# Patient Record
Sex: MALE | Race: BLACK OR AFRICAN AMERICAN | NOT HISPANIC OR LATINO
[De-identification: names, ages, dates, MRNs, and addresses within clinical notes are randomized per-mention and may not be internally consistent; named-entity substitution may affect disease eponyms.]

---

## 2020-02-28 PROBLEM — Z00.00 ENCOUNTER FOR PREVENTIVE HEALTH EXAMINATION: Status: ACTIVE | Noted: 2020-02-28

## 2020-03-04 ENCOUNTER — APPOINTMENT (OUTPATIENT)
Dept: GASTROENTEROLOGY | Facility: CLINIC | Age: 55
End: 2020-03-04

## 2024-07-14 PROBLEM — Z78.9 NON-SMOKER: Status: ACTIVE | Noted: 2024-07-14

## 2024-08-20 ENCOUNTER — NON-APPOINTMENT (OUTPATIENT)
Age: 59
End: 2024-08-20

## 2024-08-20 ENCOUNTER — APPOINTMENT (OUTPATIENT)
Dept: GASTROENTEROLOGY | Facility: CLINIC | Age: 59
End: 2024-08-20
Payer: COMMERCIAL

## 2024-08-20 DIAGNOSIS — Z80.0 FAMILY HISTORY OF MALIGNANT NEOPLASM OF DIGESTIVE ORGANS: ICD-10-CM

## 2024-08-20 DIAGNOSIS — Z86.19 PERSONAL HISTORY OF OTHER INFECTIOUS AND PARASITIC DISEASES: ICD-10-CM

## 2024-08-20 DIAGNOSIS — R10.13 EPIGASTRIC PAIN: ICD-10-CM

## 2024-08-20 PROCEDURE — 99204 OFFICE O/P NEW MOD 45 MIN: CPT

## 2024-08-24 ENCOUNTER — LABORATORY RESULT (OUTPATIENT)
Age: 59
End: 2024-08-24

## 2024-08-25 NOTE — HISTORY OF PRESENT ILLNESS
[FreeTextEntry1] :     This HPI reflects a summary and review of records : including previous and most recent  Labs, body imaging, consults and progress notes, operative and pathology reports, EKG reports, ED records, found in [facility],  [facility] and any additional records brought in by  the patient at the time of the visit.   PCP:  [facility]   59 yo M w h/o H. Pylori many yrs ago, was treated partially--had a rash to amox 3/4 of the treatment    Most recent labs reviewed : 1/10/20 H.Pylori Stool Ag--> neg  8/20/24    Today: no c/o , CP, SOB/ JONES, Cough, Wheeze, Palpitations, edema     8/20/24   Today:  recently had epigastric pain, crampy sht lived w some nausea                             Had a metallic taste in mouth, which is what he had w H.Pylori                             Wants to get retested for H.Pylori                             No NSAIDS, melena, anorexia, wt loss, early satiety                            * Abd pain-->resolved  * Nausea--> resolved  * Vomit--> no * Early satiety--> no * Belching--> no * Hiccups--> no * Regurgitation--> no * Acid Taste / Water Brash--> no * Ht burn--> no * Dysphagia--> no * Throat Clearing--> no * Hoarseness--> no * Post-Nasal Drip--> no * Congestion--> no * Globus--> no * Cough--> no * Wheeze / PC-> -no * BMs: # 4 qd * Constipation--> no * Diarrhea--> no * Bloating--> no * Strain on Defecation--> no * Incompl Evac--> no * Flatulence--> no * Gurgling--> no * Melena--> no * BPBPR-> -no * Anorexia--> no * Wt. Loss--> no

## 2024-08-25 NOTE — REVIEW OF SYSTEMS
[Scleral Icterus (Yellow Eyes)] : no scleral icterus [Skin Lesions] : no skin lesions [Proptosis] : no proptosis [Confused] : no confusion [Easy Bruising] : no tendency for easy bruising

## 2024-08-25 NOTE — ASSESSMENT
[FreeTextEntry1] :  1. Epigastric pain--> sht lived     assoc w mild nausea, metallic taste      R/O H.Pylori Gastritis            GERD            Cholelithiasis            Functional  P: No NSAIDs/ ETOH  Recommend:  * Anti-reflux diet & life-style changes reviewed & re-emphasized.   * HOB elevation /  Bedge use emphasized * Weight reduction & regular exercise emphasized  check H. Pylori UBT Abd sono ? EGD    Informed Consent: * The risks & Benefits of EGD  were discussed w patient. * This included but was not limited to perforation, bleeding, sedation /med rxns, missed lesions possibly requiring surgery, blood transfusions, antibiotics & CPR/Intubation. * Pt. understands & agrees to the procedures. The following instructions in regards to the prep and medically essential ( cardiac, pulmonary, sz, psych, endocrine)  pre-op medication administration was reviewed and emphasized with the patient .  * Pt. advised to D/C  ASA/NSAIDs  7  Days  PTP. * [ +++ ]  Dulcolax / Miralax / Mag. Citrate ,  [     ] Prepopik/ Clenpiq ,  [     ] Osmo Prep,  [    ] GoLytely,  prep. reviewed w Pt. * Hold  [           ] AM of procedure. * Hold  [           ] PM  before procedure. * Take  [           ] PM  before procedure. * Take  [           ] AM of procedure.

## 2024-08-25 NOTE — HISTORY OF PRESENT ILLNESS
[FreeTextEntry1] :     This HPI reflects a summary and review of records : including previous and most recent  Labs, body imaging, consults and progress notes, operative and pathology reports, EKG reports, ED records, found in [facility],  [facility] and any additional records brought in by  the patient at the time of the visit.   PCP:  [facility]   57 yo M w h/o H. Pylori many yrs ago, was treated partially--had a rash to amox 3/4 of the treatment    Most recent labs reviewed : 1/10/20 H.Pylori Stool Ag--> neg  8/20/24    Today: no c/o , CP, SOB/ JONES, Cough, Wheeze, Palpitations, edema     8/20/24   Today:  recently had epigastric pain, crampy sht lived w some nausea                             Had a metallic taste in mouth, which is what he had w H.Pylori                             Wants to get retested for H.Pylori                             No NSAIDS, melena, anorexia, wt loss, early satiety                            * Abd pain-->resolved  * Nausea--> resolved  * Vomit--> no * Early satiety--> no * Belching--> no * Hiccups--> no * Regurgitation--> no * Acid Taste / Water Brash--> no * Ht burn--> no * Dysphagia--> no * Throat Clearing--> no * Hoarseness--> no * Post-Nasal Drip--> no * Congestion--> no * Globus--> no * Cough--> no * Wheeze / PC-> -no * BMs: # 4 qd * Constipation--> no * Diarrhea--> no * Bloating--> no * Strain on Defecation--> no * Incompl Evac--> no * Flatulence--> no * Gurgling--> no * Melena--> no * BPBPR-> -no * Anorexia--> no * Wt. Loss--> no

## 2024-08-30 ENCOUNTER — RESULT REVIEW (OUTPATIENT)
Age: 59
End: 2024-08-30